# Patient Record
(demographics unavailable — no encounter records)

---

## 2024-11-13 NOTE — PLAN
[FreeTextEntry1] : Check labs as above.   Reviewed age-appropriate preventive screening tests with patient. He declined a flu shot.  Discussed clean eating (eg Mediterranean style eating plan) and regular exercise/staying as physically active as possible.  Include balance exercises and strength training and core strengthening exercises for bone health and to decrease risk for falls.  Reviewed importance of good self care (e.g. meditation, yoga, adequate rest, regular exercise, magnesium, clean eating, etc.).  Follow up for next physical in one to two years.

## 2024-11-13 NOTE — HEALTH RISK ASSESSMENT
[0] : 2) Feeling down, depressed, or hopeless: Not at all (0) [PHQ-2 Negative - No further assessment needed] : PHQ-2 Negative - No further assessment needed [Former] : Former [0-4] : 0-4 [> 15 Years] : > 15 Years [[id number]] : 0

## 2024-11-13 NOTE — ASSESSMENT
[FreeTextEntry1] : TYLER KEY is a 44 year old male here for a physical exam.  He is healthy overall. He has no complaints today.

## 2024-11-13 NOTE — HEALTH RISK ASSESSMENT
[0] : 2) Feeling down, depressed, or hopeless: Not at all (0) [PHQ-2 Negative - No further assessment needed] : PHQ-2 Negative - No further assessment needed [Former] : Former [0-4] : 0-4 [> 15 Years] : > 15 Years [[id number]] : 0

## 2024-11-13 NOTE — HISTORY OF PRESENT ILLNESS
[FreeTextEntry1] : TYLER KEY is a 44 year old male here for a physical exam. [de-identified] : His last physical exam was last year  Vaccines: Tetanus is up to date, around 2016  His last dentist visit was less than one year ago His last eye doctor appointment was several years ago His last dermatologist visit was a few years ago  His diet is healthy overall Exercise: cardio and weights, 4-5 days per week

## 2024-11-13 NOTE — HISTORY OF PRESENT ILLNESS
[FreeTextEntry1] : TYLER KEY is a 44 year old male here for a physical exam. [de-identified] : His last physical exam was last year  Vaccines: Tetanus is up to date, around 2016  His last dentist visit was less than one year ago His last eye doctor appointment was several years ago His last dermatologist visit was a few years ago  His diet is healthy overall Exercise: cardio and weights, 4-5 days per week

## 2025-03-12 NOTE — PHYSICAL EXAM
[No Acute Distress] : no acute distress [Well Nourished] : well nourished [Well Developed] : well developed [Well-Appearing] : well-appearing [Normal Sclera/Conjunctiva] : normal sclera/conjunctiva [PERRL] : pupils equal round and reactive to light [EOMI] : extraocular movements intact [Normal Outer Ear/Nose] : the outer ears and nose were normal in appearance [Normal Oropharynx] : the oropharynx was normal [No JVD] : no jugular venous distention [No Lymphadenopathy] : no lymphadenopathy [Supple] : supple [Thyroid Normal, No Nodules] : the thyroid was normal and there were no nodules present [No Respiratory Distress] : no respiratory distress  [No Accessory Muscle Use] : no accessory muscle use [Clear to Auscultation] : lungs were clear to auscultation bilaterally [Normal Rate] : normal rate  [Regular Rhythm] : with a regular rhythm [Normal S1, S2] : normal S1 and S2 [No Murmur] : no murmur heard [No Carotid Bruits] : no carotid bruits [No Abdominal Bruit] : a ~M bruit was not heard ~T in the abdomen [No Varicosities] : no varicosities [Pedal Pulses Present] : the pedal pulses are present [No Edema] : there was no peripheral edema [No Palpable Aorta] : no palpable aorta [No Extremity Clubbing/Cyanosis] : no extremity clubbing/cyanosis [Soft] : abdomen soft [Non Tender] : non-tender [Non-distended] : non-distended [No Masses] : no abdominal mass palpated [No HSM] : no HSM [Normal Bowel Sounds] : normal bowel sounds [Normal Posterior Cervical Nodes] : no posterior cervical lymphadenopathy cervical collar [Normal Anterior Cervical Nodes] : no anterior cervical lymphadenopathy [No CVA Tenderness] : no CVA  tenderness [No Spinal Tenderness] : no spinal tenderness [No Joint Swelling] : no joint swelling [Grossly Normal Strength/Tone] : grossly normal strength/tone [No Rash] : no rash [Coordination Grossly Intact] : coordination grossly intact [No Focal Deficits] : no focal deficits [Normal Gait] : normal gait [Deep Tendon Reflexes (DTR)] : deep tendon reflexes were 2+ and symmetric [Normal Affect] : the affect was normal [Normal Insight/Judgement] : insight and judgment were intact

## 2025-06-24 NOTE — PLAN
[FreeTextEntry1] : - if ear drops are not improving symptoms in 2 days or worsening symptoms such as worsening ear pain or fever, start oral abx

## 2025-06-24 NOTE — HISTORY OF PRESENT ILLNESS
[FreeTextEntry8] : - 2 days ago while submerged while taking a bath, felt like water got into the right ear  - wasn't able to get it out, heard it swooshing  - last night developed mild right ear pain  - today morning had scant waxy discharge from the right ear and felt better  - mildly decreased hearing on the right side  - feels like cannot get the water out  - denies fever, URI symptoms

## 2025-06-24 NOTE — PHYSICAL EXAM
[Normal] : affect was normal and insight and judgment were intact [de-identified] : right canal obstructed with wet cerumen, after cleansing, mildly erythematous